# Patient Record
Sex: MALE | Race: WHITE | NOT HISPANIC OR LATINO | Employment: UNEMPLOYED | URBAN - METROPOLITAN AREA
[De-identification: names, ages, dates, MRNs, and addresses within clinical notes are randomized per-mention and may not be internally consistent; named-entity substitution may affect disease eponyms.]

---

## 2022-08-30 ENCOUNTER — HOSPITAL ENCOUNTER (EMERGENCY)
Facility: HOSPITAL | Age: 82
Discharge: HOME | End: 2022-08-30
Attending: STUDENT IN AN ORGANIZED HEALTH CARE EDUCATION/TRAINING PROGRAM
Payer: MEDICARE

## 2022-08-30 VITALS
TEMPERATURE: 98 F | WEIGHT: 160 LBS | SYSTOLIC BLOOD PRESSURE: 124 MMHG | RESPIRATION RATE: 18 BRPM | BODY MASS INDEX: 22.4 KG/M2 | OXYGEN SATURATION: 97 % | DIASTOLIC BLOOD PRESSURE: 84 MMHG | HEIGHT: 71 IN | HEART RATE: 60 BPM

## 2022-08-30 DIAGNOSIS — S09.90XA CLOSED HEAD INJURY, INITIAL ENCOUNTER: ICD-10-CM

## 2022-08-30 DIAGNOSIS — T07.XXXA ABRASIONS OF MULTIPLE SITES: Primary | ICD-10-CM

## 2022-08-30 DIAGNOSIS — T14.90XA TRAUMA: ICD-10-CM

## 2022-08-30 DIAGNOSIS — S01.81XA FACIAL LACERATION, INITIAL ENCOUNTER: ICD-10-CM

## 2022-08-30 LAB
AMPHET UR QL SCN: NEGATIVE
APPEARANCE UR: CLEAR
APTT PPP: 24.5 SECONDS (ref 23.2–33.7)
BACTERIA #/AREA URNS AUTO: NORMAL /HPF
BARBITURATE SCN PRESENT UR: POSITIVE
BASOPHILS # BLD AUTO: 0.07 X10(3)/MCL (ref 0–0.2)
BASOPHILS NFR BLD AUTO: 0.8 %
BENZODIAZ UR QL SCN: NEGATIVE
BILIRUB UR QL STRIP.AUTO: NEGATIVE MG/DL
CANNABINOIDS UR QL SCN: NEGATIVE
COCAINE UR QL SCN: NEGATIVE
COLOR UR AUTO: ABNORMAL
EOSINOPHIL # BLD AUTO: 0.38 X10(3)/MCL (ref 0–0.9)
EOSINOPHIL NFR BLD AUTO: 4.3 %
ERYTHROCYTE [DISTWIDTH] IN BLOOD BY AUTOMATED COUNT: 14 % (ref 11.5–17)
FENTANYL UR QL SCN: NEGATIVE
GLUCOSE UR QL STRIP.AUTO: NEGATIVE MG/DL
GROUP & RH: NORMAL
HCT VFR BLD AUTO: 50.8 % (ref 42–52)
HGB BLD-MCNC: 16.7 GM/DL (ref 14–18)
IMM GRANULOCYTES # BLD AUTO: 0.04 X10(3)/MCL (ref 0–0.04)
IMM GRANULOCYTES NFR BLD AUTO: 0.4 %
INDIRECT COOMBS GEL: NORMAL
INR BLD: 1 (ref 0–1.3)
KETONES UR QL STRIP.AUTO: NEGATIVE MG/DL
LACTATE SERPL-SCNC: 2.2 MMOL/L (ref 0.5–2.2)
LACTATE SERPL-SCNC: 2.8 MMOL/L (ref 0.5–2.2)
LEUKOCYTE ESTERASE UR QL STRIP.AUTO: NEGATIVE UNIT/L
LYMPHOCYTES # BLD AUTO: 3.14 X10(3)/MCL (ref 0.6–4.6)
LYMPHOCYTES NFR BLD AUTO: 35.3 %
MCH RBC QN AUTO: 33.3 PG (ref 27–31)
MCHC RBC AUTO-ENTMCNC: 32.9 MG/DL (ref 33–36)
MCV RBC AUTO: 101.4 FL (ref 80–94)
MDMA UR QL SCN: NEGATIVE
MONOCYTES # BLD AUTO: 1.05 X10(3)/MCL (ref 0.1–1.3)
MONOCYTES NFR BLD AUTO: 11.8 %
NEUTROPHILS # BLD AUTO: 4.2 X10(3)/MCL (ref 2.1–9.2)
NEUTROPHILS NFR BLD AUTO: 47.4 %
NITRITE UR QL STRIP.AUTO: NEGATIVE
NRBC BLD AUTO-RTO: 0 %
OPIATES UR QL SCN: NEGATIVE
PCP UR QL: NEGATIVE
PH UR STRIP.AUTO: 5.5 [PH]
PH UR: 5.5 [PH] (ref 3–11)
PLATELET # BLD AUTO: 180 X10(3)/MCL (ref 130–400)
PMV BLD AUTO: 9.7 FL (ref 7.4–10.4)
PROT UR QL STRIP.AUTO: NEGATIVE MG/DL
PROTHROMBIN TIME: 13.1 SECONDS (ref 12.5–14.5)
RBC # BLD AUTO: 5.01 X10(6)/MCL (ref 4.7–6.1)
RBC #/AREA URNS AUTO: <5 /HPF
RBC UR QL AUTO: NEGATIVE UNIT/L
SP GR UR STRIP.AUTO: 1.02 (ref 1–1.03)
SPECIFIC GRAVITY, URINE AUTO (.000) (OHS): 1.02 (ref 1–1.03)
SQUAMOUS #/AREA URNS AUTO: <5 /HPF
UROBILINOGEN UR STRIP-ACNC: 0.2 MG/DL
WBC # SPEC AUTO: 8.9 X10(3)/MCL (ref 4.5–11.5)
WBC #/AREA URNS AUTO: <5 /HPF

## 2022-08-30 PROCEDURE — 36415 COLL VENOUS BLD VENIPUNCTURE: CPT | Performed by: STUDENT IN AN ORGANIZED HEALTH CARE EDUCATION/TRAINING PROGRAM

## 2022-08-30 PROCEDURE — 85730 THROMBOPLASTIN TIME PARTIAL: CPT | Performed by: STUDENT IN AN ORGANIZED HEALTH CARE EDUCATION/TRAINING PROGRAM

## 2022-08-30 PROCEDURE — 90471 IMMUNIZATION ADMIN: CPT | Performed by: STUDENT IN AN ORGANIZED HEALTH CARE EDUCATION/TRAINING PROGRAM

## 2022-08-30 PROCEDURE — 83605 ASSAY OF LACTIC ACID: CPT | Performed by: STUDENT IN AN ORGANIZED HEALTH CARE EDUCATION/TRAINING PROGRAM

## 2022-08-30 PROCEDURE — 85025 COMPLETE CBC W/AUTO DIFF WBC: CPT | Performed by: STUDENT IN AN ORGANIZED HEALTH CARE EDUCATION/TRAINING PROGRAM

## 2022-08-30 PROCEDURE — 81001 URINALYSIS AUTO W/SCOPE: CPT | Performed by: STUDENT IN AN ORGANIZED HEALTH CARE EDUCATION/TRAINING PROGRAM

## 2022-08-30 PROCEDURE — 12011 RPR F/E/E/N/L/M 2.5 CM/<: CPT

## 2022-08-30 PROCEDURE — 80307 DRUG TEST PRSMV CHEM ANLYZR: CPT | Performed by: STUDENT IN AN ORGANIZED HEALTH CARE EDUCATION/TRAINING PROGRAM

## 2022-08-30 PROCEDURE — 63600175 PHARM REV CODE 636 W HCPCS: Performed by: STUDENT IN AN ORGANIZED HEALTH CARE EDUCATION/TRAINING PROGRAM

## 2022-08-30 PROCEDURE — 25000003 PHARM REV CODE 250: Performed by: STUDENT IN AN ORGANIZED HEALTH CARE EDUCATION/TRAINING PROGRAM

## 2022-08-30 PROCEDURE — 90715 TDAP VACCINE 7 YRS/> IM: CPT | Performed by: STUDENT IN AN ORGANIZED HEALTH CARE EDUCATION/TRAINING PROGRAM

## 2022-08-30 PROCEDURE — 86850 RBC ANTIBODY SCREEN: CPT | Performed by: STUDENT IN AN ORGANIZED HEALTH CARE EDUCATION/TRAINING PROGRAM

## 2022-08-30 PROCEDURE — 99285 EMERGENCY DEPT VISIT HI MDM: CPT | Mod: 25

## 2022-08-30 PROCEDURE — G0390 TRAUMA RESPONS W/HOSP CRITI: HCPCS | Performed by: STUDENT IN AN ORGANIZED HEALTH CARE EDUCATION/TRAINING PROGRAM

## 2022-08-30 PROCEDURE — 85610 PROTHROMBIN TIME: CPT | Performed by: STUDENT IN AN ORGANIZED HEALTH CARE EDUCATION/TRAINING PROGRAM

## 2022-08-30 PROCEDURE — G0390 TRAUMA RESPONS W/HOSP CRITI: HCPCS

## 2022-08-30 RX ORDER — SODIUM CHLORIDE 9 MG/ML
1000 INJECTION, SOLUTION INTRAVENOUS
Status: COMPLETED | OUTPATIENT
Start: 2022-08-30 | End: 2022-08-30

## 2022-08-30 RX ORDER — BACITRACIN 500 [USP'U]/G
OINTMENT TOPICAL
Status: DISCONTINUED | OUTPATIENT
Start: 2022-08-30 | End: 2022-08-30 | Stop reason: HOSPADM

## 2022-08-30 RX ADMIN — TETANUS TOXOID, REDUCED DIPHTHERIA TOXOID AND ACELLULAR PERTUSSIS VACCINE, ADSORBED 0.5 ML: 5; 2.5; 8; 8; 2.5 SUSPENSION INTRAMUSCULAR at 02:08

## 2022-08-30 RX ADMIN — SODIUM CHLORIDE 1000 ML: 9 INJECTION, SOLUTION INTRAVENOUS at 04:08

## 2022-08-30 NOTE — CONSULTS
"General/Acute Care Surgery   History and Physical     HD#0  POD#* No surgery found *    HPI  ~82yoM presents as a level 1 trauma after tripping and falling onto the sidewalk. Per EMS he hit his head, no LOC. Met level 1 trauma criteria for BP 71/46 On arrival GCS 15. Getting 1L bolus in trauma bay. Complaining of pain to nose. Lacerations noted over face. Pt placed in c-collar. Of note patient states he is visiting from out of town to serve as a  in the "[employer]".     Past Medical History: essential tremor  Surgical History: pacemaker    Review of Systems: 10 point ROS negative except as stated in HPI       Objective  Temp:  [93.4 °F (34.1 °C)] 93.4 °F (34.1 °C)  Pulse:  [61] 61  Resp:  [18] 18  SpO2:  [95 %] 95 %  BP: (100)/(60) 100/60      GEN: NAD   NEURO: AAOx3   HEENT: NC, superficial abrasions to nasal bridge and philtrum  RESP: No increased WOB, equal rise and fall of the chest, on room air  CV: Regular rate   ABD: Soft, non tender, non distended. No guarding or rebound   : Deferred  EXT: Moving all extremities spontaneously. DP intact bilaterally. No spinal tenderness.     Labs  Lactate 2.8    Imaging  CT head: chronic age-related changes. No acute process  CT c-spine: Loss of the normal lordotic curve of the cervical spine most likely related to spasm but otherwise unremarkable with no evidence of acute fracture or dislocation seen      Assessment/Plan  82yoM presents as level 1 trauma activation after a ground level fall. CT scans negative for acute traumatic abnormality.    - Recommend fluids to clear lactate  - Recommend closure of facial lacerations  - Rest of disposition per ED. Stable for discharge from trauma surgery standpoint      Ana Hauser MD  [facility] General Surgery    8/30/2022  2:38 PM    "

## 2022-08-30 NOTE — ED PROVIDER NOTES
Encounter Date: 8/30/2022    SCRIBE #1 NOTE: I, Eric Maldonado, am scribing for, and in the presence of,  Oscar Lee MD. I have scribed the following portions of the note - Other sections scribed: HPI, ROS, PE.     History     Chief Complaint   Patient presents with    Trauma     Level 1 trauma acitvation, GCS-15     81 y/o male presents to ED via EMS as a level 1 trauma activation for trip and fall just prior to arrival. EMS reports multiple lacerations and abrasions to face. Pt states his only complaint is facial pain. Pt describes the pain as 3/10. EMS reports pt had one drink of ETOH.    The history is provided by the patient and the EMS personnel. No  was used.   Fall  The accident occurred just prior to arrival. The fall occurred while walking. He landed on [place]. The volume of blood lost was minimal. The point of impact was the head and face. The pain is present in the face. The pain is at a severity of 3/10. He was Ambulatory at the scene. There was Alcohol use involved in the accident. Pertinent negatives include no neck pain, no back pain, no fever, no numbness, no abdominal pain, no nausea, no vomiting and no hematuria.   Review of patient's allergies indicates:  Not on File  No past medical history on file.  No past surgical history on file.  No family history on file.     Review of Systems   Constitutional:  Negative for chills and fever.   HENT:  Negative for drooling and sore throat.         Facial pain   Eyes:  Negative for pain and redness.   Respiratory:  Negative for shortness of breath, wheezing and stridor.    Cardiovascular:  Negative for chest pain, palpitations and leg swelling.   Gastrointestinal:  Negative for abdominal pain, nausea and vomiting.   Genitourinary:  Negative for dysuria and hematuria.   Musculoskeletal:  Negative for back pain, neck pain and neck stiffness.   Skin:  Positive for wound. Negative for rash.   Neurological:  Negative for  weakness and numbness.   Hematological:  Does not bruise/bleed easily.     Physical Exam     Initial Vitals [08/30/22 1425]   BP Pulse Resp Temp SpO2   100/60 61 18 (S) (!) 93.4 °F (34.1 °C) 95 %      MAP       --         Physical Exam    Nursing note and vitals reviewed.  Constitutional: He appears well-developed and well-nourished. Airway: Normal. Breathing: Normal. Circulation: Normal. He is not diaphoretic. Pulses:Radial palpable. No distress. Cervical collar in place.   HENT:   Head: Normocephalic.   Mouth/Throat: Oropharynx is clear and moist.   Airway intact, blood to nares, no hematoma   Eyes: Pupils: Normal pupils. EOM are normal. Pupils are equal, round, and reactive to light.   Pupils 3 mm reactive bilaterally   Neck: Neck supple.   Normal range of motion.  Cardiovascular:  Normal rate and regular rhythm.           2+ radial pulse, 2+ dorsalis pedis pulse   Pulmonary/Chest: Breath sounds normal. No respiratory distress.   Bilateral breath sounds   Abdominal: Abdomen is soft. He exhibits no distension. There is no abdominal tenderness. The pelvis is stable.   Musculoskeletal:         General: No edema. Normal range of motion.      Left upper arm: No deformity.      Cervical back: Normal range of motion and neck supple. No deformity or bony tenderness. Normal.      Thoracic back: Normal. No deformity or bony tenderness.      Lumbar back: Normal. No deformity or bony tenderness.      Left upper leg: No deformity.      Comments: No midline cervical tenderness     Neurological: He is alert. No cranial nerve deficit. GCS score is 15. GCS eye subscore is 4. GCS verbal subscore is 5. GCS motor subscore is 6.   Skin: Skin is warm. Capillary refill takes less than 2 seconds. Abrasion (Abrasions to upper face, blilateral knees) and laceration (Upper lip, nose) noted. No rash noted.   Skin tear to right elbow, abrasions to nose and upper lip   Psychiatric: He has a normal mood and affect. His behavior is normal.        ED Course   Critical Care    Date/Time: 9/1/2022 9:47 AM  Performed by: Oscar Lee MD  Authorized by: Oscar Lee MD   Total critical care time (exclusive of procedural time) : 0 minutes  Critical care time was exclusive of separately billable procedures and treating other patients.  Critical care was necessary to treat or prevent imminent or life-threatening deterioration of the following conditions: trauma.  Critical care was time spent personally by me on the following activities: blood draw for specimens, development of treatment plan with patient or surrogate, discussions with consultants, evaluation of patient's response to treatment, examination of patient, obtaining history from patient or surrogate, ordering and performing treatments and interventions, ordering and review of laboratory studies, ordering and review of radiographic studies, pulse oximetry, re-evaluation of patient's condition and review of old charts.      Lac Repair    Date/Time: 9/1/2022 9:47 AM  Performed by: Oscar Lee MD  Authorized by: Oscar Lee MD     Consent:     Consent obtained:  Verbal    Consent given by:  Patient    Risks, benefits, and alternatives were discussed: yes      Risks discussed:  Infection, pain, poor cosmetic result and poor wound healing  Anesthesia:     Anesthesia method:  None  Laceration details:     Location:  Lip    Lip location:  Upper exterior lip    Length (cm):  0.5  Pre-procedure details:     Preparation:  Imaging obtained to evaluate for foreign bodies  Exploration:     Imaging outcome: foreign body not noted    Treatment:     Area cleansed with:  Saline    Amount of cleaning:  Standard    Irrigation solution:  Sterile saline    Irrigation method:  Syringe and pressure wash    Visualized foreign bodies/material removed: no    Skin repair:     Repair method:  Tissue adhesive  Repair type:     Repair type:  Simple  Post-procedure details:     Dressing:   Antibiotic ointment    Procedure completion:  Tolerated well, no immediate complications  Labs Reviewed   LACTIC ACID, PLASMA - Abnormal; Notable for the following components:       Result Value    Lactic Acid Level 2.8 (*)     All other components within normal limits   URINALYSIS, REFLEX TO URINE CULTURE - Abnormal; Notable for the following components:    Color, UA Dark Yellow (*)     All other components within normal limits   DRUG SCREEN, URINE (BEAKER) - Abnormal; Notable for the following components:    Barbituates, Urine Positive (*)     All other components within normal limits    Narrative:     Cut off concentrations:    Amphetamines - 1000 ng/ml  Barbiturates - 200 ng/ml  Benzodiazepine - 200 ng/ml  Cannabinoids (THC) - 50 ng/ml  Cocaine - 300 ng/ml  Fentanyl - 1.0 ng/ml  MDMA - 500 ng/ml  Opiates - 300 ng/ml   Phencyclidine (PCP) - 25 ng/ml    Specimen submitted for drug analysis and tested for pH and specific gravity in order to evaluate sample integrity. Suspect tampering if specific gravity is <1.003 and/or pH is not within the range of 4.5 - 8.0  False negatives may result form substances such as bleach added to urine.  False positives may result for the presence of a substance with similar chemical structure to the drug or its metabolite.    This test provides only a PRELIMINARY analytical test result. A more specific alternate chemical method must be used in order to obtain a confirmed analytical result. Gas chromatography/mass spectrometry (GC/MS) is the preferred confirmatory method. Other chemical confirmation methods are available. Clinical consideration and professional judgement should be applied to any drug of abuse test result, particularly when preliminary positive results are used.    Positive results will be confirmed only at the physicians request. Unconfirmed screening results are to be used only for medical purposes (treatment).        CBC WITH DIFFERENTIAL - Abnormal; Notable for the  following components:    .4 (*)     MCH 33.3 (*)     MCHC 32.9 (*)     All other components within normal limits   PROTIME-INR - Normal   APTT - Normal   LACTIC ACID, PLASMA - Normal   URINALYSIS, MICROSCOPIC - Normal   CBC W/ AUTO DIFFERENTIAL    Narrative:     The following orders were created for panel order CBC Auto Differential.  Procedure                               Abnormality         Status                     ---------                               -----------         ------                     CBC with Differential[[id number]]        Abnormal            Final result                 Please view results for these tests on the individual orders.   CBC W/ AUTO DIFFERENTIAL    Narrative:     The following orders were created for panel order CBC auto differential.  Procedure                               Abnormality         Status                     ---------                               -----------         ------                     CBC with Differential[[id number]]                            In process                   Please view results for these tests on the individual orders.   COMPREHENSIVE METABOLIC PANEL   PROTIME-INR   APTT   ALCOHOL,MEDICAL (ETHANOL)   CBC WITH DIFFERENTIAL   TYPE & SCREEN          Imaging Results              X-Ray Chest 1 View (Final result)  Result time 08/30/22 15:49:04      Final result by Bassam Arriola MD (08/30/22 15:49:04)                   Impression:      No acute pulmonary process identified.      Electronically signed by: Bassam Arriola  Date:    08/30/2022  Time:    15:49               Narrative:    EXAMINATION:  XR CHEST 1 VIEW    CLINICAL HISTORY:  r/o bleeding or hemorrhage;    TECHNIQUE:  Frontal view(s) of the chest.    COMPARISON:  No relevant comparison studies available at the time of dictation.    FINDINGS:  Left-sided single lead pacemaker.  Cardiac silhouette upper normal in size.  The lungs are well-inflated.  No consolidation identified.  No  significant pleural effusion or discernible pneumothorax.                                       X-Ray Elbow Complete Right (Final result)  Result time 08/30/22 15:47:20      Final result by Bassam Arriola MD (08/30/22 15:47:20)                   Impression:      No acute osseous process identified.      Electronically signed by: Bassam Arriola  Date:    08/30/2022  Time:    15:47               Narrative:    EXAMINATION:  XR ELBOW COMPLETE 3 VIEW RIGHT    CLINICAL HISTORY:  . Injury, unspecified, initial encounter    TECHNIQUE:  AP, lateral, and oblique views of the right elbow    COMPARISON:  None    FINDINGS:  No acute fracture identified.  Joint alignments are maintained.  No definitive elbow effusion.  Mild vascular calcifications.                                       X-Ray Pelvis Routine AP (Final result)  Result time 08/30/22 15:47:27      Final result by Linda Flor MD (08/30/22 15:47:27)                   Impression:      No acute osseous abnormality.      Electronically signed by: Linda Flor  Date:    08/30/2022  Time:    15:47               Narrative:    EXAMINATION:  XR PELVIS ROUTINE AP    CLINICAL HISTORY:  r/o bleeding or hemorrhage;    TECHNIQUE:  AP view of the pelvis was performed.    COMPARISON:  None.    FINDINGS:  No appreciable fracture. No dislocation.   Skin folds may obscure fracture plane.    There are vascular calcifications including venous phleboliths and arterial atherosclerosis.                                       X-Ray Knee Complete 4 Or More Views Bilat (Final result)  Result time 08/30/22 15:45:36   Procedure changed from X-Ray Knee 3 View Bilateral     Final result by Linda Flor MD (08/30/22 15:45:36)                   Impression:      No acute osseous abnormality.      Electronically signed by: Linda Flor  Date:    08/30/2022  Time:    15:45               Narrative:    EXAMINATION:  XR KNEE COMP 4 OR MORE VIEWS BILAT    CLINICAL HISTORY:  trauma ;   Injury, unspecified, initial encounter    TECHNIQUE:  AP, lateral, and oblique views of the left knee and right knee.    COMPARISON:  None.    FINDINGS:  RIGHT KNEE:    No fracture. No dislocation.   Mild narrowing at the medial compartment.    Calcific atherosclerosis.    LEFT KNEE:    No fracture. No dislocation.   Mild narrowing at the medial compartment.    Calcific atherosclerosis.                                       CT Cervical Spine Without Contrast (Final result)  Result time 08/30/22 15:08:11      Final result by Lee Kraft MD (08/30/22 15:08:11)                   Impression:      Loss of the normal lordotic curve of the cervical spine most likely related to spasm but otherwise unremarkable with no evidence of acute fracture or dislocation seen      Electronically signed by: Lee Kraft  Date:    08/30/2022  Time:    15:08               Narrative:    EXAMINATION:  CT CERVICAL SPINE WITHOUT CONTRAST    CLINICAL HISTORY:  Trauma;    TECHNIQUE:  Low dose axial images, sagittal and coronal reformations were performed though the cervical spine.  Contrast was not administered. Automatic exposure control is utilized to reduce patient radiation exposure.    COMPARISON:  None    FINDINGS:  The vertebral body heights are well maintained. There is some loss of the normal lordotic curve cervical spine most likely related to spasm. No fracture is seen. No dislocation is seen. The odontoid and lateral masses appear grossly unremarkable.                                       CT Head Without Contrast (Final result)  Result time 08/30/22 15:06:30      Final result by Lee Kraft MD (08/30/22 15:06:30)                   Impression:      Chronic age-related changes.  No acute process      Electronically signed by: Lee Kraft  Date:    08/30/2022  Time:    15:06               Narrative:    EXAMINATION:  CT HEAD WITHOUT CONTRAST    CLINICAL HISTORY:  Trauma;    TECHNIQUE:  Multiple axial  images were obtained from the base of the brain to the vertex without contrast administration.  Sagittal and coronal reconstructions were performed..Automatic exposure control is utilized to reduce patient radiation exposure.    COMPARISON:  None    FINDINGS:  There is no intracranial mass or lesion seen.  No hemorrhage is seen.  No acute infarct is seen.  There is some cerebral atrophy seen.  There is some compensatory ventricular dilatation and periventricular white matter changes consistent with the patient's age.  Calvarium is intact.  The posterior fossa is intact.  The visualized portions of the paranasal sinuses show no acute abnormality.                                    X-Rays:   Independently Interpreted Readings:   Other Readings:  CXR: No ptx, no fx  R Elbow XR: no fx  Pelvis: no fx  B/L Knee: no fx  L wrist: patient refused  Medications   Tdap (BOOSTRIX) vaccine injection 0.5 mL (0.5 mLs Intramuscular Given 8/30/22 1445)   0.9%  NaCl infusion (1,000 mLs Intravenous New Bag 8/30/22 1604)     Medical Decision Making:   Differential Diagnosis:   ICH, skull fracture, spinal injury, SC injury, abrasion, laceration, fracture, dislocation  Independently Interpreted Test(s):   I have ordered and independently interpreted X-rays - see prior notes.  Clinical Tests:   Lab Tests: Ordered and Reviewed  Radiological Study: Ordered and Reviewed  ED Management:  MDM: Harpal Garza is a 82 y.o. male with above past medical history who presents to the ED for trip and fall. Vital signs reviewed. Afebrile, HDS. Patient activated as a level 1 trauma due to hypotension in the field. Trauma team at bedside. Primary survey completed. Labs pending. Pain and nausea control PRN. Tetanus updated. CTH and c-spine pending. Plain films of areas of pain. Patient agrees with plan and all questions answered at bedside.    Update: CTs negative. Plain films negative. C-collar cleared. Patient ambulatory at his baseline and tolerates PO.  Cleared for discharge by trauma surgery. I offered suture repair of patient's laceration to face/nose, patient declined and requests tissue adhesive. R/B/A discussed. Tissue adhesive applied with adequate closure. Wound care instructions provided. Strict return precautions discussed and patient verbalized understanding.    Dispo: Discharge    Data Reviewed/Counseling: I have personally reviewed the patient's vital signs, nursing notes, and other relevant tests, information, and imaging. I had a detailed discussion regarding the historical points, exam findings, and any diagnostic results supporting the discharge diagnosis.     Portions of this note were dictated using voice recognition software. Although it was reviewed for accuracy, some inherent voice recognition errors may have occurred and be present in this document.    Other:   I have discussed this case with another health care provider.        Scribe Attestation:   Scribe #1: I performed the above scribed service and the documentation accurately describes the services I performed. I attest to the accuracy of the note.    Attending Attestation:           Physician Attestation for Scribe:  Physician Attestation Statement for Scribe #1: I, Oscar Lee MD, reviewed documentation, as scribed by Eric Maldonado in my presence, and it is both accurate and complete.           ED Course as of 09/01/22 0954   Tue Aug 30, 2022   2027 I have reassessed the patient.  Patient is resting comfortably, no acute distress.  Vital signs stable.  Discussed all results including incidental findings.  Discussed need for follow up and discussed return precautions.  Answered all questions at this time.  Hemodynamically stable for continued outpatient management. Ambulatory at his baseline with a steady gait. Tolerates oral intake. Patient verbalized understanding and agreed to plan.   [MC]      ED Course User Index  [MC] Oscar Lee MD             Clinical  Impression:   Final diagnoses:  [T14.90XA] Trauma  [T07.XXXA] Abrasions of multiple sites (Primary)  [S01.81XA] Facial laceration, initial encounter  [S09.90XA] Closed head injury, initial encounter      ED Disposition Condition    Discharge Stable          ED Prescriptions    None       Follow-up Information    None          Oscar Lee MD  09/01/22 0999

## 2022-08-30 NOTE — PROGRESS NOTES
Normal male presented to emergency department after falling abrasions of his upper and lower extremities and face with no apparent bony deformities, level 1 status was called due to systolic blood pressure in the 70s     With administration of less than 1 saline 1 L of saline he now has multiple systolic blood pressure readings above 120, he is awake and alert x3 in minimal distress and has a GCS of 15     Regular rate and rhythm    Clear to auscultation bilaterally no wheezes rales or rhonchi    Abdomen soft nontender nondistended bowel sounds are present    Integument:  Multiple abrasions of his knees and elbows, there are no bony deformities and they are abrasions of his nose and cheeks bilaterally     Assessment plan 82-year-old male status post ground level fall, he was taken to CT scan and official reads are pending    He may require observation however discharge today is feasible considering he is highly functioning, full consult or history and physical to follow thank you

## 2022-08-31 NOTE — DISCHARGE INSTRUCTIONS
Please follow up with your primary care physician in 2-3 days.      We offered to close your wounds with sutures but you preferred to use skin glue. Please do your best to not let the glue get wet. Please return to the ED in 48 hours for a wound check. Please return sooner if you notice increasing pain, spreading redness, foul odors, or drainage from the wounds.    Please return to the emergency department if you develop any worsening symptoms, fever, chest pain, difficulty breathing, or any other new symptoms or concerns.      Thank you for allowing us to take care of you today.